# Patient Record
Sex: MALE | Race: WHITE | NOT HISPANIC OR LATINO
[De-identification: names, ages, dates, MRNs, and addresses within clinical notes are randomized per-mention and may not be internally consistent; named-entity substitution may affect disease eponyms.]

---

## 2018-12-28 ENCOUNTER — APPOINTMENT (OUTPATIENT)
Dept: INTERNAL MEDICINE | Facility: CLINIC | Age: 73
End: 2018-12-28
Payer: MEDICARE

## 2018-12-28 VITALS
DIASTOLIC BLOOD PRESSURE: 82 MMHG | BODY MASS INDEX: 28.93 KG/M2 | SYSTOLIC BLOOD PRESSURE: 150 MMHG | HEIGHT: 66 IN | WEIGHT: 180 LBS

## 2018-12-28 DIAGNOSIS — Z86.39 PERSONAL HISTORY OF OTHER ENDOCRINE, NUTRITIONAL AND METABOLIC DISEASE: ICD-10-CM

## 2018-12-28 DIAGNOSIS — Z86.79 PERSONAL HISTORY OF OTHER DISEASES OF THE CIRCULATORY SYSTEM: ICD-10-CM

## 2018-12-28 DIAGNOSIS — Z86.018 PERSONAL HISTORY OF OTHER BENIGN NEOPLASM: ICD-10-CM

## 2018-12-28 DIAGNOSIS — Z86.69 PERSONAL HISTORY OF OTHER DISEASES OF THE NERVOUS SYSTEM AND SENSE ORGANS: ICD-10-CM

## 2018-12-28 DIAGNOSIS — Z87.19 PERSONAL HISTORY OF OTHER DISEASES OF THE DIGESTIVE SYSTEM: ICD-10-CM

## 2018-12-28 PROCEDURE — 99213 OFFICE O/P EST LOW 20 MIN: CPT

## 2018-12-30 PROBLEM — Z87.19 HISTORY OF BARRETT'S ESOPHAGUS: Status: RESOLVED | Noted: 2018-12-28 | Resolved: 2018-12-30

## 2018-12-30 PROBLEM — Z86.69 HISTORY OF BENIGN ESSENTIAL TREMOR: Status: RESOLVED | Noted: 2018-12-28 | Resolved: 2018-12-30

## 2018-12-30 PROBLEM — Z86.79 HISTORY OF HYPERTENSION: Status: RESOLVED | Noted: 2018-12-28 | Resolved: 2018-12-30

## 2018-12-30 NOTE — PLAN
[FreeTextEntry1] : 72 y/o diabetic presents with c/o bruising right lower leg. He is concerned because it has extended down to heel in past several days, reassured it is resolving. If s/s increase or persist RTO

## 2018-12-30 NOTE — PHYSICAL EXAM
[No Acute Distress] : no acute distress [Well Nourished] : well nourished [Well Developed] : well developed [Well-Appearing] : well-appearing [Normal Sclera/Conjunctiva] : normal sclera/conjunctiva [PERRL] : pupils equal round and reactive to light [EOMI] : extraocular movements intact [Normal Outer Ear/Nose] : the outer ears and nose were normal in appearance [Normal Oropharynx] : the oropharynx was normal [No JVD] : no jugular venous distention [Supple] : supple [No Lymphadenopathy] : no lymphadenopathy [Thyroid Normal, No Nodules] : the thyroid was normal and there were no nodules present [No Respiratory Distress] : no respiratory distress  [Clear to Auscultation] : lungs were clear to auscultation bilaterally [No Accessory Muscle Use] : no accessory muscle use [Normal Rate] : normal rate  [Regular Rhythm] : with a regular rhythm [Normal S1, S2] : normal S1 and S2 [No Murmur] : no murmur heard [No Carotid Bruits] : no carotid bruits [No Abdominal Bruit] : a ~M bruit was not heard ~T in the abdomen [No Varicosities] : no varicosities [Pedal Pulses Present] : the pedal pulses are present [No Edema] : there was no peripheral edema [No Extremity Clubbing/Cyanosis] : no extremity clubbing/cyanosis [No Palpable Aorta] : no palpable aorta [Soft] : abdomen soft [Non Tender] : non-tender [Non-distended] : non-distended [No Masses] : no abdominal mass palpated [No HSM] : no HSM [Normal Bowel Sounds] : normal bowel sounds [Normal Posterior Cervical Nodes] : no posterior cervical lymphadenopathy [Normal Anterior Cervical Nodes] : no anterior cervical lymphadenopathy [No CVA Tenderness] : no CVA  tenderness [No Spinal Tenderness] : no spinal tenderness [No Joint Swelling] : no joint swelling [Grossly Normal Strength/Tone] : grossly normal strength/tone [No Rash] : no rash [Normal Gait] : normal gait [Coordination Grossly Intact] : coordination grossly intact [No Focal Deficits] : no focal deficits [Deep Tendon Reflexes (DTR)] : deep tendon reflexes were 2+ and symmetric [Memory Grossly Normal] : memory grossly normal [Normal Affect] : the affect was normal [Alert and Oriented x3] : oriented to person, place, and time [Normal Mood] : the mood was normal [Normal Insight/Judgement] : insight and judgment were intact [de-identified] : Lump/ecchymosis right lower leg, upper shin, ecchymosis extending down leg to heel. Non-tender, color evolving to greenish/yellow in places

## 2018-12-30 NOTE — HISTORY OF PRESENT ILLNESS
[FreeTextEntry8] : 73-year-old male presents with complaint of bruising on his right lower leg. Noticed black and blue lump upper medial left shin a few days ago and since then the discoloration or has spread down his leg to his heel. Unsure what if anything he hit it on, denies pain, burning/itching, no redness, no swelling

## 2018-12-31 PROBLEM — Z86.39 HISTORY OF HYPERLIPIDEMIA: Status: RESOLVED | Noted: 2018-12-28 | Resolved: 2018-12-31

## 2018-12-31 PROBLEM — Z86.018 HISTORY OF OSTEOMA: Status: RESOLVED | Noted: 2018-12-28 | Resolved: 2018-12-31

## 2018-12-31 PROBLEM — Z86.39 HISTORY OF TYPE 2 DIABETES MELLITUS: Status: RESOLVED | Noted: 2018-12-28 | Resolved: 2018-12-31

## 2019-02-15 ENCOUNTER — RX RENEWAL (OUTPATIENT)
Age: 74
End: 2019-02-15

## 2019-03-27 ENCOUNTER — RX RENEWAL (OUTPATIENT)
Age: 74
End: 2019-03-27

## 2019-04-13 ENCOUNTER — INBOUND DOCUMENT (OUTPATIENT)
Age: 74
End: 2019-04-13

## 2019-06-13 ENCOUNTER — RX RENEWAL (OUTPATIENT)
Age: 74
End: 2019-06-13

## 2019-06-15 ENCOUNTER — RX RENEWAL (OUTPATIENT)
Age: 74
End: 2019-06-15

## 2019-07-11 ENCOUNTER — RX RENEWAL (OUTPATIENT)
Age: 74
End: 2019-07-11

## 2019-08-07 ENCOUNTER — RX RENEWAL (OUTPATIENT)
Age: 74
End: 2019-08-07

## 2019-10-22 ENCOUNTER — RECORD ABSTRACTING (OUTPATIENT)
Age: 74
End: 2019-10-22

## 2019-10-22 DIAGNOSIS — Z72.89 OTHER PROBLEMS RELATED TO LIFESTYLE: ICD-10-CM

## 2019-10-22 DIAGNOSIS — Z80.9 FAMILY HISTORY OF MALIGNANT NEOPLASM, UNSPECIFIED: ICD-10-CM

## 2019-10-22 DIAGNOSIS — Z87.891 PERSONAL HISTORY OF NICOTINE DEPENDENCE: ICD-10-CM

## 2019-10-22 RX ORDER — PEN NEEDLE, DIABETIC 33 GX5/32"
NEEDLE, DISPOSABLE MISCELLANEOUS
Refills: 0 | Status: ACTIVE | COMMUNITY

## 2019-11-04 ENCOUNTER — NON-APPOINTMENT (OUTPATIENT)
Age: 74
End: 2019-11-04

## 2019-11-04 ENCOUNTER — APPOINTMENT (OUTPATIENT)
Dept: INTERNAL MEDICINE | Facility: CLINIC | Age: 74
End: 2019-11-04
Payer: MEDICARE

## 2019-11-04 VITALS
SYSTOLIC BLOOD PRESSURE: 146 MMHG | WEIGHT: 187 LBS | DIASTOLIC BLOOD PRESSURE: 82 MMHG | HEIGHT: 66 IN | TEMPERATURE: 96.8 F | BODY MASS INDEX: 30.05 KG/M2

## 2019-11-04 PROCEDURE — 93000 ELECTROCARDIOGRAM COMPLETE: CPT

## 2019-11-04 PROCEDURE — 36415 COLL VENOUS BLD VENIPUNCTURE: CPT

## 2019-11-04 PROCEDURE — 90662 IIV NO PRSV INCREASED AG IM: CPT

## 2019-11-04 PROCEDURE — G0008: CPT

## 2019-11-04 PROCEDURE — 99214 OFFICE O/P EST MOD 30 MIN: CPT | Mod: 25

## 2019-11-04 NOTE — PLAN
[FreeTextEntry1] : 74-year-old male diabetic presents for annual exam, feels well, denies chest pain shortness of breath palpitations dizziness.\par \par Overdue for colonoscopy and upper endoscopy, he will call Dr. Ball's office\par Call one week to review today's labs

## 2019-11-04 NOTE — HISTORY OF PRESENT ILLNESS
[FreeTextEntry1] : Annual exam [de-identified] : 74-year-old male diabetic presents for annual exam, feels well, denies chest pain shortness of breath palpitations dizziness. He checks his glucose occasionally with results being in the low 100s. Taking medication as prescribed. Uses Lotrisone occasionally for intertriginous candida mostly in the summer

## 2019-11-04 NOTE — HEALTH RISK ASSESSMENT
[Yes] : Yes [2 - 3 times a week (3 pts)] : 2 - 3  times a week (3 points) [5 or 6 (2 pts)] : 5 or 6 (2  points) [Never (0 pts)] : Never (0 points) [One fall no injury in past year] : Patient reported one fall in the past year without injury [0] : 2) Feeling down, depressed, or hopeless: Not at all (0) [Patient declined mammogram] : Patient declined mammogram [Patient declined PAP Smear] : Patient declined PAP Smear [Patient declined bone density test] : Patient declined bone density test [Patient reported colonoscopy was normal] : Patient reported colonoscopy was normal [HIV test declined] : HIV test declined [Hepatitis C test declined] : Hepatitis C test declined [None] : None [With Family] : lives with family [] :  [Good] : ~his/her~  mood as  good [] : No [[id number]] : 0 [Change in mental status noted] : No change in mental status noted [ColonoscopyDate] : 2014

## 2019-11-04 NOTE — PHYSICAL EXAM
[Normal] : normal gait, coordination grossly intact, no focal deficits [de-identified] : Denies pain, lumps and discharge [FreeTextEntry1] : Deferred GI [de-identified] : Bladder Normal on Palpation [de-identified] : No lymphadenopathy [de-identified] : Denies excessive thirst, urination, fatigue [de-identified] : No rash or skin lesion [de-identified] : Alert and Oriented x3.  Appropriate mood and affect

## 2019-11-04 NOTE — COUNSELING
[Fall prevention counseling provided] : Fall prevention counseling provided [Benefits of weight loss discussed] : Benefits of weight loss discussed [Encouraged to increase physical activity] : Encouraged to increase physical activity

## 2019-11-05 LAB
25(OH)D3 SERPL-MCNC: 27.7 NG/ML
ALBUMIN SERPL ELPH-MCNC: 5 G/DL
ALP BLD-CCNC: 56 U/L
ALT SERPL-CCNC: 25 U/L
ANION GAP SERPL CALC-SCNC: 15 MMOL/L
APPEARANCE: CLEAR
AST SERPL-CCNC: 23 U/L
BASOPHILS # BLD AUTO: 0.04 K/UL
BASOPHILS NFR BLD AUTO: 0.6 %
BILIRUB SERPL-MCNC: 0.4 MG/DL
BILIRUBIN URINE: NEGATIVE
BLOOD URINE: NEGATIVE
BUN SERPL-MCNC: 15 MG/DL
CALCIUM SERPL-MCNC: 10.1 MG/DL
CHLORIDE SERPL-SCNC: 98 MMOL/L
CHOLEST SERPL-MCNC: 156 MG/DL
CHOLEST/HDLC SERPL: 3.3 RATIO
CO2 SERPL-SCNC: 22 MMOL/L
COLOR: YELLOW
CREAT SERPL-MCNC: 1.22 MG/DL
EOSINOPHIL # BLD AUTO: 0.13 K/UL
EOSINOPHIL NFR BLD AUTO: 2 %
ESTIMATED AVERAGE GLUCOSE: 143 MG/DL
FOLATE SERPL-MCNC: 15.6 NG/ML
GLUCOSE QUALITATIVE U: ABNORMAL
GLUCOSE SERPL-MCNC: 176 MG/DL
HBA1C MFR BLD HPLC: 6.6 %
HCT VFR BLD CALC: 45.5 %
HDLC SERPL-MCNC: 48 MG/DL
HGB BLD-MCNC: 14.6 G/DL
IMM GRANULOCYTES NFR BLD AUTO: 0.3 %
KETONES URINE: NEGATIVE
LDLC SERPL CALC-MCNC: 62 MG/DL
LEUKOCYTE ESTERASE URINE: NEGATIVE
LYMPHOCYTES # BLD AUTO: 1.59 K/UL
LYMPHOCYTES NFR BLD AUTO: 24.8 %
MAN DIFF?: NORMAL
MCHC RBC-ENTMCNC: 30.4 PG
MCHC RBC-ENTMCNC: 32.1 GM/DL
MCV RBC AUTO: 94.6 FL
MONOCYTES # BLD AUTO: 0.74 K/UL
MONOCYTES NFR BLD AUTO: 11.5 %
NEUTROPHILS # BLD AUTO: 3.89 K/UL
NEUTROPHILS NFR BLD AUTO: 60.8 %
NITRITE URINE: NEGATIVE
PH URINE: 5.5
PLATELET # BLD AUTO: 259 K/UL
POTASSIUM SERPL-SCNC: 5.2 MMOL/L
PROT SERPL-MCNC: 8.4 G/DL
PROTEIN URINE: NORMAL
PSA FREE FLD-MCNC: 26 %
PSA FREE SERPL-MCNC: 0.8 NG/ML
PSA SERPL-MCNC: 3.09 NG/ML
RBC # BLD: 4.81 M/UL
RBC # FLD: 13 %
SODIUM SERPL-SCNC: 135 MMOL/L
SPECIFIC GRAVITY URINE: 1.02
TRIGL SERPL-MCNC: 232 MG/DL
TSH SERPL-ACNC: 3.82 UIU/ML
UROBILINOGEN URINE: NORMAL
VIT B12 SERPL-MCNC: 351 PG/ML
WBC # FLD AUTO: 6.41 K/UL

## 2019-11-23 ENCOUNTER — RX RENEWAL (OUTPATIENT)
Age: 74
End: 2019-11-23

## 2019-12-30 ENCOUNTER — RESULT REVIEW (OUTPATIENT)
Age: 74
End: 2019-12-30

## 2019-12-31 ENCOUNTER — APPOINTMENT (OUTPATIENT)
Dept: GASTROENTEROLOGY | Facility: HOSPITAL | Age: 74
End: 2019-12-31

## 2020-03-04 ENCOUNTER — RX RENEWAL (OUTPATIENT)
Age: 75
End: 2020-03-04

## 2020-05-10 ENCOUNTER — RX RENEWAL (OUTPATIENT)
Age: 75
End: 2020-05-10

## 2020-06-05 ENCOUNTER — APPOINTMENT (OUTPATIENT)
Dept: INTERNAL MEDICINE | Facility: CLINIC | Age: 75
End: 2020-06-05
Payer: MEDICARE

## 2020-06-05 VITALS
DIASTOLIC BLOOD PRESSURE: 68 MMHG | SYSTOLIC BLOOD PRESSURE: 126 MMHG | WEIGHT: 187 LBS | HEIGHT: 66 IN | BODY MASS INDEX: 30.05 KG/M2

## 2020-06-05 PROCEDURE — 36415 COLL VENOUS BLD VENIPUNCTURE: CPT

## 2020-06-05 PROCEDURE — 99213 OFFICE O/P EST LOW 20 MIN: CPT | Mod: 25

## 2020-06-05 RX ORDER — SITAGLIPTIN 50 MG/1
50 TABLET, FILM COATED ORAL
Qty: 90 | Refills: 3 | Status: DISCONTINUED | COMMUNITY
Start: 2019-08-07 | End: 2020-06-05

## 2020-06-05 NOTE — PLAN
[FreeTextEntry1] : 68 six-year[age]old male with diabetes, hypertension, hyperlipidemia, primary insomnia presents for routine followup. Patient feels very well not particularly physically active but denies chest pain shortness of breath palpitations dizziness, and no lower extremity swelling.\par \par Prescription insurance has changed and he needs PA for  Levemir which we will try to obtain\par \par Review diet and encouraged to increase exercise i.e. walking\par \par Call next week to review labs

## 2020-06-05 NOTE — PHYSICAL EXAM
[No Acute Distress] : no acute distress [Well Nourished] : well nourished [Well Developed] : well developed [Well-Appearing] : well-appearing [Normal Sclera/Conjunctiva] : normal sclera/conjunctiva [PERRL] : pupils equal round and reactive to light [Normal Outer Ear/Nose] : the outer ears and nose were normal in appearance [EOMI] : extraocular movements intact [Normal Oropharynx] : the oropharynx was normal [No Lymphadenopathy] : no lymphadenopathy [No JVD] : no jugular venous distention [Supple] : supple [Thyroid Normal, No Nodules] : the thyroid was normal and there were no nodules present [No Respiratory Distress] : no respiratory distress  [No Accessory Muscle Use] : no accessory muscle use [Clear to Auscultation] : lungs were clear to auscultation bilaterally [Normal Rate] : normal rate  [Regular Rhythm] : with a regular rhythm [Normal S1, S2] : normal S1 and S2 [No Carotid Bruits] : no carotid bruits [No Murmur] : no murmur heard [No Abdominal Bruit] : a ~M bruit was not heard ~T in the abdomen [No Varicosities] : no varicosities [Pedal Pulses Present] : the pedal pulses are present [No Edema] : there was no peripheral edema [No Palpable Aorta] : no palpable aorta [No Extremity Clubbing/Cyanosis] : no extremity clubbing/cyanosis [Soft] : abdomen soft [Non Tender] : non-tender [Non-distended] : non-distended [No Masses] : no abdominal mass palpated [No HSM] : no HSM [Normal Bowel Sounds] : normal bowel sounds [Normal Posterior Cervical Nodes] : no posterior cervical lymphadenopathy [Normal Anterior Cervical Nodes] : no anterior cervical lymphadenopathy [No CVA Tenderness] : no CVA  tenderness [No Spinal Tenderness] : no spinal tenderness [No Joint Swelling] : no joint swelling [Grossly Normal Strength/Tone] : grossly normal strength/tone [No Rash] : no rash [No Focal Deficits] : no focal deficits [Coordination Grossly Intact] : coordination grossly intact [Normal Gait] : normal gait [Deep Tendon Reflexes (DTR)] : deep tendon reflexes were 2+ and symmetric [Normal Affect] : the affect was normal [Normal Insight/Judgement] : insight and judgment were intact

## 2020-06-05 NOTE — HISTORY OF PRESENT ILLNESS
[FreeTextEntry1] : Followup diabetes, hypertension, insomnia [de-identified] : 74 year old male presents for followup as noted, feels very well, denies chest pain shortness of breath palpitations dizziness. Denies any problems during pandemic , still able to see grandchildren on weekends

## 2020-06-06 LAB
ALBUMIN SERPL ELPH-MCNC: 4.9 G/DL
ALP BLD-CCNC: 56 U/L
ALT SERPL-CCNC: 20 U/L
ANION GAP SERPL CALC-SCNC: 13 MMOL/L
AST SERPL-CCNC: 21 U/L
BASOPHILS # BLD AUTO: 0.04 K/UL
BASOPHILS NFR BLD AUTO: 0.6 %
BILIRUB SERPL-MCNC: 0.2 MG/DL
BUN SERPL-MCNC: 19 MG/DL
CALCIUM SERPL-MCNC: 10 MG/DL
CHLORIDE SERPL-SCNC: 101 MMOL/L
CHOLEST SERPL-MCNC: 145 MG/DL
CHOLEST/HDLC SERPL: 3 RATIO
CO2 SERPL-SCNC: 23 MMOL/L
CREAT SERPL-MCNC: 1.06 MG/DL
EOSINOPHIL # BLD AUTO: 0.11 K/UL
EOSINOPHIL NFR BLD AUTO: 1.6 %
ESTIMATED AVERAGE GLUCOSE: 140 MG/DL
GLUCOSE SERPL-MCNC: 138 MG/DL
HBA1C MFR BLD HPLC: 6.5 %
HCT VFR BLD CALC: 41 %
HDLC SERPL-MCNC: 48 MG/DL
HGB BLD-MCNC: 13.2 G/DL
IMM GRANULOCYTES NFR BLD AUTO: 0.4 %
LDLC SERPL CALC-MCNC: 64 MG/DL
LYMPHOCYTES # BLD AUTO: 1.95 K/UL
LYMPHOCYTES NFR BLD AUTO: 27.6 %
MAN DIFF?: NORMAL
MCHC RBC-ENTMCNC: 29.9 PG
MCHC RBC-ENTMCNC: 32.2 GM/DL
MCV RBC AUTO: 93 FL
MONOCYTES # BLD AUTO: 0.83 K/UL
MONOCYTES NFR BLD AUTO: 11.8 %
NEUTROPHILS # BLD AUTO: 4.1 K/UL
NEUTROPHILS NFR BLD AUTO: 58 %
PLATELET # BLD AUTO: 238 K/UL
POTASSIUM SERPL-SCNC: 5 MMOL/L
PROT SERPL-MCNC: 7.8 G/DL
RBC # BLD: 4.41 M/UL
RBC # FLD: 13.4 %
SARS-COV-2 IGG SERPL IA-ACNC: 0.1 INDEX
SARS-COV-2 IGG SERPL QL IA: NEGATIVE
SODIUM SERPL-SCNC: 137 MMOL/L
TRIGL SERPL-MCNC: 164 MG/DL
WBC # FLD AUTO: 7.06 K/UL

## 2020-07-09 ENCOUNTER — RX RENEWAL (OUTPATIENT)
Age: 75
End: 2020-07-09

## 2020-12-03 ENCOUNTER — APPOINTMENT (OUTPATIENT)
Dept: INTERNAL MEDICINE | Facility: CLINIC | Age: 75
End: 2020-12-03
Payer: MEDICARE

## 2020-12-03 VITALS
TEMPERATURE: 95.6 F | WEIGHT: 178 LBS | DIASTOLIC BLOOD PRESSURE: 70 MMHG | BODY MASS INDEX: 28.61 KG/M2 | HEIGHT: 66 IN | SYSTOLIC BLOOD PRESSURE: 134 MMHG

## 2020-12-03 PROCEDURE — 36415 COLL VENOUS BLD VENIPUNCTURE: CPT

## 2020-12-03 PROCEDURE — 99213 OFFICE O/P EST LOW 20 MIN: CPT | Mod: 25

## 2020-12-03 PROCEDURE — 90662 IIV NO PRSV INCREASED AG IM: CPT

## 2020-12-03 PROCEDURE — G0008: CPT

## 2020-12-03 NOTE — PLAN
[FreeTextEntry1] : 75-year-old male is noted presents for followup, feels well, no complaints offered. Denies chest pain shortness of breath palpitations dizziness\par \par Review diet and stressed the importance of regular exercise i.e. walking.\par \par Followup 3-4 months for annual exam. Call next week for lab results from today's visit

## 2020-12-03 NOTE — HISTORY OF PRESENT ILLNESS
[FreeTextEntry1] : Followup diabetes, hypertension [de-identified] : 75-year-old male diabetic, hypertensive presents for followup. Feels very well, denies chest pain shortness of breath palpitations dizziness. Walking occasionally for exercise but fairly sedentary for the most part. Check sugar occasionally,, stable in the low 100s.

## 2020-12-04 LAB
ALBUMIN SERPL ELPH-MCNC: 4.8 G/DL
ALP BLD-CCNC: 55 U/L
ALT SERPL-CCNC: 22 U/L
ANION GAP SERPL CALC-SCNC: 11 MMOL/L
AST SERPL-CCNC: 23 U/L
BASOPHILS # BLD AUTO: 0.06 K/UL
BASOPHILS NFR BLD AUTO: 0.8 %
BILIRUB SERPL-MCNC: 0.3 MG/DL
BUN SERPL-MCNC: 21 MG/DL
CALCIUM SERPL-MCNC: 10.1 MG/DL
CHLORIDE SERPL-SCNC: 99 MMOL/L
CO2 SERPL-SCNC: 25 MMOL/L
CREAT SERPL-MCNC: 1.24 MG/DL
EOSINOPHIL # BLD AUTO: 0.25 K/UL
EOSINOPHIL NFR BLD AUTO: 3.2 %
ESTIMATED AVERAGE GLUCOSE: 128 MG/DL
GLUCOSE SERPL-MCNC: 136 MG/DL
HBA1C MFR BLD HPLC: 6.1 %
HCT VFR BLD CALC: 42.5 %
HGB BLD-MCNC: 13.8 G/DL
IMM GRANULOCYTES NFR BLD AUTO: 0.3 %
LYMPHOCYTES # BLD AUTO: 1.7 K/UL
LYMPHOCYTES NFR BLD AUTO: 21.7 %
MAN DIFF?: NORMAL
MCHC RBC-ENTMCNC: 31.1 PG
MCHC RBC-ENTMCNC: 32.5 GM/DL
MCV RBC AUTO: 95.7 FL
MONOCYTES # BLD AUTO: 1.09 K/UL
MONOCYTES NFR BLD AUTO: 13.9 %
NEUTROPHILS # BLD AUTO: 4.73 K/UL
NEUTROPHILS NFR BLD AUTO: 60.1 %
PLATELET # BLD AUTO: 250 K/UL
POTASSIUM SERPL-SCNC: 5.3 MMOL/L
PROT SERPL-MCNC: 8 G/DL
RBC # BLD: 4.44 M/UL
RBC # FLD: 13.1 %
SODIUM SERPL-SCNC: 136 MMOL/L
WBC # FLD AUTO: 7.85 K/UL

## 2021-01-20 ENCOUNTER — RX RENEWAL (OUTPATIENT)
Age: 76
End: 2021-01-20

## 2021-03-03 ENCOUNTER — RX RENEWAL (OUTPATIENT)
Age: 76
End: 2021-03-03

## 2021-05-03 NOTE — REVIEW OF SYSTEMS
[Duration: ___ wks] : duration: [unfilled] weeks [Normal?] : normal delivery [Negative] : Heme/Lymph [de-identified] : bruise right shin

## 2021-05-04 ENCOUNTER — RX RENEWAL (OUTPATIENT)
Age: 76
End: 2021-05-04

## 2021-06-17 ENCOUNTER — APPOINTMENT (OUTPATIENT)
Dept: INTERNAL MEDICINE | Facility: CLINIC | Age: 76
End: 2021-06-17
Payer: MEDICARE

## 2021-06-17 VITALS
SYSTOLIC BLOOD PRESSURE: 146 MMHG | BODY MASS INDEX: 29.25 KG/M2 | WEIGHT: 182 LBS | HEIGHT: 66 IN | DIASTOLIC BLOOD PRESSURE: 60 MMHG

## 2021-06-17 DIAGNOSIS — Z20.822 CONTACT WITH AND (SUSPECTED) EXPOSURE TO COVID-19: ICD-10-CM

## 2021-06-17 PROCEDURE — 99213 OFFICE O/P EST LOW 20 MIN: CPT | Mod: 25,CS

## 2021-06-17 PROCEDURE — 36415 COLL VENOUS BLD VENIPUNCTURE: CPT

## 2021-06-17 RX ORDER — ASPIRIN ENTERIC COATED TABLETS 81 MG 81 MG/1
81 TABLET, DELAYED RELEASE ORAL
Refills: 0 | Status: DISCONTINUED | COMMUNITY
End: 2021-06-17

## 2021-06-17 NOTE — HISTORY OF PRESENT ILLNESS
[FreeTextEntry1] : Followup diabetes, hyperlipidemia, covid infection [de-identified] : 75-year-old male presents for followup as noted, in March of this year he and his wife were sick and they both tested positive for covert. They were sick for about 2 weeks, never called the office so there was no treatment  but they both fully recovered.\par \par Denies chest pain shortness of breath palpitations dizziness. He states he lost weight while he was sick but he since gained back-unfortunately.

## 2021-06-17 NOTE — PLAN
[FreeTextEntry1] : 75-year-old male as noted, feels very well, fully recovered from virus. Denies chest pain shortness of breath palpitations dizziness\par \par Reviewed diet and stressed the importance of increasing activity/exercise/walking.\par \par Call next week to review labs

## 2021-06-18 LAB
25(OH)D3 SERPL-MCNC: 24.8 NG/ML
ALBUMIN SERPL ELPH-MCNC: 4.7 G/DL
ALP BLD-CCNC: 59 U/L
ALT SERPL-CCNC: 20 U/L
ANION GAP SERPL CALC-SCNC: 13 MMOL/L
AST SERPL-CCNC: 26 U/L
BASOPHILS # BLD AUTO: 0.04 K/UL
BASOPHILS NFR BLD AUTO: 0.6 %
BILIRUB SERPL-MCNC: 0.2 MG/DL
BUN SERPL-MCNC: 19 MG/DL
CALCIUM SERPL-MCNC: 10 MG/DL
CHLORIDE SERPL-SCNC: 102 MMOL/L
CHOLEST SERPL-MCNC: 163 MG/DL
CO2 SERPL-SCNC: 21 MMOL/L
COVID-19 NUCLEOCAPSID  GAM ANTIBODY INTERPRETATION: POSITIVE
CREAT SERPL-MCNC: 1.07 MG/DL
EOSINOPHIL # BLD AUTO: 0.14 K/UL
EOSINOPHIL NFR BLD AUTO: 2.3 %
ESTIMATED AVERAGE GLUCOSE: 143 MG/DL
GLUCOSE SERPL-MCNC: 135 MG/DL
HBA1C MFR BLD HPLC: 6.6 %
HCT VFR BLD CALC: 41.1 %
HDLC SERPL-MCNC: 48 MG/DL
HGB BLD-MCNC: 13.5 G/DL
IMM GRANULOCYTES NFR BLD AUTO: 0.3 %
LDLC SERPL CALC-MCNC: 78 MG/DL
LYMPHOCYTES # BLD AUTO: 1.63 K/UL
LYMPHOCYTES NFR BLD AUTO: 26.3 %
MAN DIFF?: NORMAL
MCHC RBC-ENTMCNC: 30.1 PG
MCHC RBC-ENTMCNC: 32.8 GM/DL
MCV RBC AUTO: 91.5 FL
MONOCYTES # BLD AUTO: 0.72 K/UL
MONOCYTES NFR BLD AUTO: 11.6 %
NEUTROPHILS # BLD AUTO: 3.65 K/UL
NEUTROPHILS NFR BLD AUTO: 58.9 %
NONHDLC SERPL-MCNC: 115 MG/DL
PLATELET # BLD AUTO: 255 K/UL
POTASSIUM SERPL-SCNC: 4.9 MMOL/L
PROT SERPL-MCNC: 8 G/DL
RBC # BLD: 4.49 M/UL
RBC # FLD: 13.8 %
SARS-COV-2 AB SERPL QL IA: 177 INDEX
SODIUM SERPL-SCNC: 137 MMOL/L
TRIGL SERPL-MCNC: 185 MG/DL
WBC # FLD AUTO: 6.2 K/UL

## 2021-06-27 ENCOUNTER — RX RENEWAL (OUTPATIENT)
Age: 76
End: 2021-06-27

## 2021-10-25 ENCOUNTER — APPOINTMENT (OUTPATIENT)
Dept: INTERNAL MEDICINE | Facility: CLINIC | Age: 76
End: 2021-10-25
Payer: MEDICARE

## 2021-10-25 VITALS
WEIGHT: 178 LBS | BODY MASS INDEX: 28.61 KG/M2 | SYSTOLIC BLOOD PRESSURE: 122 MMHG | HEIGHT: 66 IN | DIASTOLIC BLOOD PRESSURE: 64 MMHG

## 2021-10-25 DIAGNOSIS — Z92.29 PERSONAL HISTORY OF OTHER DRUG THERAPY: ICD-10-CM

## 2021-10-25 DIAGNOSIS — Z23 ENCOUNTER FOR IMMUNIZATION: ICD-10-CM

## 2021-10-25 PROCEDURE — 36415 COLL VENOUS BLD VENIPUNCTURE: CPT

## 2021-10-25 PROCEDURE — G0008: CPT

## 2021-10-25 PROCEDURE — 90662 IIV NO PRSV INCREASED AG IM: CPT

## 2021-10-25 PROCEDURE — 99213 OFFICE O/P EST LOW 20 MIN: CPT | Mod: 25

## 2021-10-25 RX ORDER — CLOTRIMAZOLE AND BETAMETHASONE DIPROPIONATE 10; .5 MG/G; MG/G
1-0.05 CREAM TOPICAL
Qty: 1 | Refills: 2 | Status: ACTIVE | COMMUNITY
Start: 1900-01-01 | End: 1900-01-01

## 2021-10-25 NOTE — HISTORY OF PRESENT ILLNESS
[FreeTextEntry1] : Followup diabetes, hyperlipidemia [de-identified] : 76-year-old male diabetic presents with his wife for followup, he states he feels great, no complaints offered, denies chest pain shortness of breath palpitations dizziness. Takes medications as prescribed, not testing as frequently as I would like but he feels well, no c/o, A1C in June 6.6%

## 2021-10-25 NOTE — PLAN
[FreeTextEntry1] : 76-year-old male diabetic has noted presents for follow up, no complaints offered, states he feels great, denies chest pain shortness of breath palpitations dizziness\par \par Reviewed diet, encouraged increasing exercise i.e. walking, reviewed glucose readings.\par \par Call 2-3 days to review labs

## 2021-10-26 LAB
ALBUMIN SERPL ELPH-MCNC: 4.6 G/DL
ALP BLD-CCNC: 56 U/L
ALT SERPL-CCNC: 22 U/L
ANION GAP SERPL CALC-SCNC: 14 MMOL/L
AST SERPL-CCNC: 28 U/L
BASOPHILS # BLD AUTO: 0.05 K/UL
BASOPHILS NFR BLD AUTO: 0.7 %
BILIRUB SERPL-MCNC: 0.2 MG/DL
BUN SERPL-MCNC: 19 MG/DL
CALCIUM SERPL-MCNC: 9.8 MG/DL
CHLORIDE SERPL-SCNC: 99 MMOL/L
CHOLEST SERPL-MCNC: 157 MG/DL
CO2 SERPL-SCNC: 21 MMOL/L
CREAT SERPL-MCNC: 1.24 MG/DL
EOSINOPHIL # BLD AUTO: 0.15 K/UL
EOSINOPHIL NFR BLD AUTO: 2 %
ESTIMATED AVERAGE GLUCOSE: 128 MG/DL
GLUCOSE SERPL-MCNC: 103 MG/DL
HBA1C MFR BLD HPLC: 6.1 %
HCT VFR BLD CALC: 42 %
HDLC SERPL-MCNC: 49 MG/DL
HGB BLD-MCNC: 13.8 G/DL
IMM GRANULOCYTES NFR BLD AUTO: 0.3 %
LDLC SERPL CALC-MCNC: 74 MG/DL
LYMPHOCYTES # BLD AUTO: 1.87 K/UL
LYMPHOCYTES NFR BLD AUTO: 24.8 %
MAN DIFF?: NORMAL
MCHC RBC-ENTMCNC: 31.7 PG
MCHC RBC-ENTMCNC: 32.9 GM/DL
MCV RBC AUTO: 96.3 FL
MONOCYTES # BLD AUTO: 0.86 K/UL
MONOCYTES NFR BLD AUTO: 11.4 %
NEUTROPHILS # BLD AUTO: 4.6 K/UL
NEUTROPHILS NFR BLD AUTO: 60.8 %
NONHDLC SERPL-MCNC: 108 MG/DL
PLATELET # BLD AUTO: 233 K/UL
POTASSIUM SERPL-SCNC: 5 MMOL/L
PROT SERPL-MCNC: 7.8 G/DL
RBC # BLD: 4.36 M/UL
RBC # FLD: 13.4 %
SODIUM SERPL-SCNC: 134 MMOL/L
TRIGL SERPL-MCNC: 172 MG/DL
WBC # FLD AUTO: 7.55 K/UL

## 2021-10-27 LAB — GGT SERPL-CCNC: 39 U/L

## 2021-10-28 LAB
COVID-19 SPIKE DOMAIN ANTIBODY INTERPRETATION: POSITIVE
SARS-COV-2 AB SERPL IA-ACNC: >250 U/ML

## 2022-01-27 ENCOUNTER — RX RENEWAL (OUTPATIENT)
Age: 77
End: 2022-01-27

## 2022-03-11 ENCOUNTER — RX RENEWAL (OUTPATIENT)
Age: 77
End: 2022-03-11

## 2022-04-27 ENCOUNTER — RX RENEWAL (OUTPATIENT)
Age: 77
End: 2022-04-27

## 2022-06-03 ENCOUNTER — RX RENEWAL (OUTPATIENT)
Age: 77
End: 2022-06-03

## 2022-07-19 ENCOUNTER — APPOINTMENT (OUTPATIENT)
Dept: FAMILY MEDICINE | Facility: CLINIC | Age: 77
End: 2022-07-19

## 2022-08-04 ENCOUNTER — APPOINTMENT (OUTPATIENT)
Dept: FAMILY MEDICINE | Facility: CLINIC | Age: 77
End: 2022-08-04

## 2022-08-04 VITALS
HEART RATE: 95 BPM | DIASTOLIC BLOOD PRESSURE: 58 MMHG | SYSTOLIC BLOOD PRESSURE: 112 MMHG | TEMPERATURE: 96.7 F | HEIGHT: 66 IN | OXYGEN SATURATION: 98 % | BODY MASS INDEX: 27.8 KG/M2 | WEIGHT: 173 LBS | RESPIRATION RATE: 16 BRPM

## 2022-08-04 DIAGNOSIS — Z76.89 PERSONS ENCOUNTERING HEALTH SERVICES IN OTHER SPECIFIED CIRCUMSTANCES: ICD-10-CM

## 2022-08-04 PROCEDURE — 36415 COLL VENOUS BLD VENIPUNCTURE: CPT

## 2022-08-04 PROCEDURE — 99214 OFFICE O/P EST MOD 30 MIN: CPT | Mod: 25

## 2022-08-05 LAB
CHOLEST SERPL-MCNC: 141 MG/DL
ESTIMATED AVERAGE GLUCOSE: 134 MG/DL
HBA1C MFR BLD HPLC: 6.3 %
HDLC SERPL-MCNC: 53 MG/DL
LDLC SERPL CALC-MCNC: 44 MG/DL
NONHDLC SERPL-MCNC: 88 MG/DL
TRIGL SERPL-MCNC: 223 MG/DL

## 2022-08-07 NOTE — HISTORY OF PRESENT ILLNESS
[FreeTextEntry8] : 77 yo M with hx of DM2 here to establish care with new physician and have Hgb A1c monitored as well as lipids. Reports no acute concerns, taking medications as prescribed. Would like to defer annual physical to next visit.

## 2022-08-07 NOTE — PLAN
[FreeTextEntry1] : BP well controlled, labs drawn\par DM2, labs drawn\par Reviewed lipids, repeat drawn in office

## 2022-08-18 LAB
25(OH)D3 SERPL-MCNC: 25 NG/ML
ALBUMIN SERPL ELPH-MCNC: 4.7 G/DL
ALP BLD-CCNC: 63 U/L
ALT SERPL-CCNC: 19 U/L
ANION GAP SERPL CALC-SCNC: 17 MMOL/L
AST SERPL-CCNC: 25 U/L
BILIRUB SERPL-MCNC: 0.3 MG/DL
BUN SERPL-MCNC: 28 MG/DL
CALCIUM SERPL-MCNC: 9.9 MG/DL
CHLORIDE SERPL-SCNC: 100 MMOL/L
CO2 SERPL-SCNC: 17 MMOL/L
CREAT SERPL-MCNC: 1.81 MG/DL
EGFR: 38 ML/MIN/1.73M2
GLUCOSE SERPL-MCNC: 136 MG/DL
POTASSIUM SERPL-SCNC: 4.5 MMOL/L
PROT SERPL-MCNC: 7.8 G/DL
PSA FREE FLD-MCNC: 23 %
PSA FREE SERPL-MCNC: 0.85 NG/ML
PSA SERPL-MCNC: 3.73 NG/ML
SODIUM SERPL-SCNC: 134 MMOL/L
T4 FREE SERPL-MCNC: 1.2 NG/DL
TSH SERPL-ACNC: 4.46 UIU/ML

## 2022-08-30 ENCOUNTER — APPOINTMENT (OUTPATIENT)
Dept: INTERNAL MEDICINE | Facility: CLINIC | Age: 77
End: 2022-08-30

## 2022-08-30 PROCEDURE — 36415 COLL VENOUS BLD VENIPUNCTURE: CPT

## 2022-09-01 LAB
ALBUMIN SERPL ELPH-MCNC: 4.9 G/DL
ALP BLD-CCNC: 56 U/L
ALT SERPL-CCNC: 18 U/L
ANION GAP SERPL CALC-SCNC: 14 MMOL/L
AST SERPL-CCNC: 23 U/L
BILIRUB SERPL-MCNC: 0.2 MG/DL
BUN SERPL-MCNC: 18 MG/DL
CALCIUM SERPL-MCNC: 9.8 MG/DL
CHLORIDE SERPL-SCNC: 100 MMOL/L
CO2 SERPL-SCNC: 21 MMOL/L
CREAT SERPL-MCNC: 1.13 MG/DL
EGFR: 67 ML/MIN/1.73M2
GLUCOSE SERPL-MCNC: 115 MG/DL
POTASSIUM SERPL-SCNC: 5.3 MMOL/L
PROT SERPL-MCNC: 7.8 G/DL
SODIUM SERPL-SCNC: 135 MMOL/L

## 2022-09-09 ENCOUNTER — APPOINTMENT (OUTPATIENT)
Dept: FAMILY MEDICINE | Facility: CLINIC | Age: 77
End: 2022-09-09

## 2022-09-09 VITALS
SYSTOLIC BLOOD PRESSURE: 132 MMHG | DIASTOLIC BLOOD PRESSURE: 70 MMHG | TEMPERATURE: 95.1 F | HEART RATE: 75 BPM | BODY MASS INDEX: 28.93 KG/M2 | WEIGHT: 180 LBS | HEIGHT: 66 IN | OXYGEN SATURATION: 99 %

## 2022-09-09 DIAGNOSIS — Z87.898 PERSONAL HISTORY OF OTHER SPECIFIED CONDITIONS: ICD-10-CM

## 2022-09-09 PROCEDURE — G0439: CPT

## 2022-09-09 PROCEDURE — 36415 COLL VENOUS BLD VENIPUNCTURE: CPT

## 2022-09-11 PROBLEM — Z87.898 HISTORY OF URINARY FREQUENCY: Status: RESOLVED | Noted: 2019-11-04 | Resolved: 2022-09-11

## 2022-09-11 NOTE — HEALTH RISK ASSESSMENT
[Good] : ~his/her~  mood as  good [Former] : Former [Yes] : Yes [Monthly or less (1 pt)] : Monthly or less (1 point) [1 or 2 (0 pts)] : 1 or 2 (0 points) [Never (0 pts)] : Never (0 points) [No falls in past year] : Patient reported no falls in the past year [0] : 2) Feeling down, depressed, or hopeless: Not at all (0) [Patient declined mammogram] : Patient declined mammogram [Patient declined PAP Smear] : Patient declined PAP Smear [Patient declined bone density test] : Patient declined bone density test [Patient reported colonoscopy was normal] : Patient reported colonoscopy was normal [[id number]] : 0 [ColonoscopyDate] : 12/19

## 2022-09-11 NOTE — HISTORY OF PRESENT ILLNESS
[FreeTextEntry1] : Annual physical [de-identified] : 78 yo M with hx of DM2, Springer's esophagus, HTN, HLD, and recently resolved episode of BETHANIE with unclear etiology, here for an annual physical. Patient reports no acute concerns at this time. Accompanied at the visit with his wife. Pt is UTD with colonoscopy, next due in 2026 with Dr. Ball. Additionally, due for repeat EGD given hx of Randell's, due this year, taking PPI regularly. Regarding, DM2, well controlled, on combination insulin and oral medications.

## 2022-09-11 NOTE — PLAN
[FreeTextEntry1] : 78 yo M with DM2, HTN, HLD, Randell's, presenting for annual physical\par - labs drawn in office\par - DM2 well controlled, continue current management\par - 3 month follow up for DM2\par - colonoscopy UTD, next due 2026\par - due for EGD, patient to follow up with Dr. Ball

## 2022-09-22 LAB
25(OH)D3 SERPL-MCNC: 28.1 NG/ML
ALBUMIN SERPL ELPH-MCNC: 4.5 G/DL
ALP BLD-CCNC: 55 U/L
ALT SERPL-CCNC: 14 U/L
ANION GAP SERPL CALC-SCNC: 10 MMOL/L
APPEARANCE: CLEAR
AST SERPL-CCNC: 23 U/L
BACTERIA UR CULT: NORMAL
BACTERIA: NEGATIVE
BASOPHILS # BLD AUTO: 0.05 K/UL
BASOPHILS NFR BLD AUTO: 0.7 %
BILIRUB SERPL-MCNC: 0.3 MG/DL
BILIRUBIN URINE: NEGATIVE
BLOOD URINE: NEGATIVE
BUN SERPL-MCNC: 17 MG/DL
CALCIUM SERPL-MCNC: 9.6 MG/DL
CHLORIDE SERPL-SCNC: 101 MMOL/L
CHOLEST SERPL-MCNC: 143 MG/DL
CO2 SERPL-SCNC: 23 MMOL/L
COLOR: NORMAL
CREAT SERPL-MCNC: 1.08 MG/DL
EGFR: 70 ML/MIN/1.73M2
EOSINOPHIL # BLD AUTO: 0.16 K/UL
EOSINOPHIL NFR BLD AUTO: 2.4 %
GLUCOSE QUALITATIVE U: NEGATIVE
GLUCOSE SERPL-MCNC: 84 MG/DL
HAV IGM SER QL: NONREACTIVE
HBV SURFACE AB SER QL: NONREACTIVE
HBV SURFACE AG SER QL: NONREACTIVE
HCT VFR BLD CALC: 40.7 %
HCV AB SER QL: NONREACTIVE
HCV S/CO RATIO: 0.13 S/CO
HDLC SERPL-MCNC: 61 MG/DL
HEPATITIS A IGG ANTIBODY: NONREACTIVE
HGB BLD-MCNC: 12.9 G/DL
HIV1+2 AB SPEC QL IA.RAPID: NONREACTIVE
HYALINE CASTS: 0 /LPF
IMM GRANULOCYTES NFR BLD AUTO: 0.3 %
KETONES URINE: NEGATIVE
LDLC SERPL CALC-MCNC: 62 MG/DL
LEUKOCYTE ESTERASE URINE: NEGATIVE
LYMPHOCYTES # BLD AUTO: 1.96 K/UL
LYMPHOCYTES NFR BLD AUTO: 29 %
MAN DIFF?: NORMAL
MCHC RBC-ENTMCNC: 30.1 PG
MCHC RBC-ENTMCNC: 31.7 GM/DL
MCV RBC AUTO: 95.1 FL
MICROSCOPIC-UA: NORMAL
MONOCYTES # BLD AUTO: 0.84 K/UL
MONOCYTES NFR BLD AUTO: 12.4 %
NEUTROPHILS # BLD AUTO: 3.73 K/UL
NEUTROPHILS NFR BLD AUTO: 55.2 %
NITRITE URINE: NEGATIVE
NONHDLC SERPL-MCNC: 82 MG/DL
PH URINE: 6
PLATELET # BLD AUTO: 245 K/UL
POTASSIUM SERPL-SCNC: 5.3 MMOL/L
PROT SERPL-MCNC: 7.6 G/DL
PROTEIN URINE: NEGATIVE
PSA FREE FLD-MCNC: 19 %
PSA FREE SERPL-MCNC: 0.68 NG/ML
PSA SERPL-MCNC: 3.66 NG/ML
RBC # BLD: 4.28 M/UL
RBC # FLD: 13.4 %
RED BLOOD CELLS URINE: 1 /HPF
SODIUM SERPL-SCNC: 133 MMOL/L
SPECIFIC GRAVITY URINE: 1.01
SQUAMOUS EPITHELIAL CELLS: 0 /HPF
T4 FREE SERPL-MCNC: 1.2 NG/DL
TRIGL SERPL-MCNC: 99 MG/DL
TSH SERPL-ACNC: 3.53 UIU/ML
UROBILINOGEN URINE: NORMAL
WBC # FLD AUTO: 6.76 K/UL
WHITE BLOOD CELLS URINE: 0 /HPF

## 2022-10-20 ENCOUNTER — APPOINTMENT (OUTPATIENT)
Dept: INTERNAL MEDICINE | Facility: CLINIC | Age: 77
End: 2022-10-20

## 2022-10-20 PROCEDURE — G0008: CPT

## 2022-10-20 PROCEDURE — 90662 IIV NO PRSV INCREASED AG IM: CPT

## 2023-01-03 ENCOUNTER — RX RENEWAL (OUTPATIENT)
Age: 78
End: 2023-01-03

## 2023-04-25 ENCOUNTER — RX RENEWAL (OUTPATIENT)
Age: 78
End: 2023-04-25

## 2023-05-25 ENCOUNTER — APPOINTMENT (OUTPATIENT)
Dept: FAMILY MEDICINE | Facility: CLINIC | Age: 78
End: 2023-05-25
Payer: MEDICARE

## 2023-05-25 ENCOUNTER — RX RENEWAL (OUTPATIENT)
Age: 78
End: 2023-05-25

## 2023-05-25 VITALS
HEART RATE: 78 BPM | WEIGHT: 182 LBS | BODY MASS INDEX: 29.25 KG/M2 | DIASTOLIC BLOOD PRESSURE: 70 MMHG | SYSTOLIC BLOOD PRESSURE: 132 MMHG | OXYGEN SATURATION: 98 % | HEIGHT: 66 IN

## 2023-05-25 DIAGNOSIS — K21.9 GASTRO-ESOPHAGEAL REFLUX DISEASE W/OUT ESOPHAGITIS: ICD-10-CM

## 2023-05-25 DIAGNOSIS — N17.9 ACUTE KIDNEY FAILURE, UNSPECIFIED: ICD-10-CM

## 2023-05-25 PROCEDURE — 36415 COLL VENOUS BLD VENIPUNCTURE: CPT

## 2023-05-25 PROCEDURE — 99214 OFFICE O/P EST MOD 30 MIN: CPT | Mod: 25

## 2023-05-25 RX ORDER — OMEPRAZOLE 20 MG/1
20 CAPSULE, DELAYED RELEASE ORAL
Qty: 90 | Refills: 3 | Status: ACTIVE | COMMUNITY
Start: 2019-03-27 | End: 1900-01-01

## 2023-05-30 DIAGNOSIS — S80.11XA CONTUSION OF RIGHT LOWER LEG, INITIAL ENCOUNTER: ICD-10-CM

## 2023-05-30 PROBLEM — K21.9 CHRONIC GERD: Status: ACTIVE | Noted: 2019-03-27

## 2023-05-30 PROBLEM — N17.9 AKI (ACUTE KIDNEY INJURY): Status: RESOLVED | Noted: 2022-08-18 | Resolved: 2023-05-30

## 2023-05-30 LAB
25(OH)D3 SERPL-MCNC: 27.1 NG/ML
ALBUMIN SERPL ELPH-MCNC: 4.6 G/DL
ALP BLD-CCNC: 62 U/L
ALT SERPL-CCNC: 12 U/L
ANION GAP SERPL CALC-SCNC: 16 MMOL/L
AST SERPL-CCNC: 18 U/L
BILIRUB SERPL-MCNC: 0.2 MG/DL
BUN SERPL-MCNC: 17 MG/DL
CALCIUM SERPL-MCNC: 10 MG/DL
CHLORIDE SERPL-SCNC: 102 MMOL/L
CHOLEST SERPL-MCNC: 131 MG/DL
CO2 SERPL-SCNC: 21 MMOL/L
CREAT SERPL-MCNC: 1.2 MG/DL
EGFR: 62 ML/MIN/1.73M2
ESTIMATED AVERAGE GLUCOSE: 146 MG/DL
GLUCOSE SERPL-MCNC: 103 MG/DL
HBA1C MFR BLD HPLC: 6.7 %
HDLC SERPL-MCNC: 52 MG/DL
LDLC SERPL CALC-MCNC: 51 MG/DL
NONHDLC SERPL-MCNC: 79 MG/DL
POTASSIUM SERPL-SCNC: 4.9 MMOL/L
PROT SERPL-MCNC: 7.9 G/DL
SODIUM SERPL-SCNC: 139 MMOL/L
TRIGL SERPL-MCNC: 139 MG/DL

## 2023-05-30 NOTE — HEALTH RISK ASSESSMENT
[Yes] : Yes [Monthly or less (1 pt)] : Monthly or less (1 point) [1 or 2 (0 pts)] : 1 or 2 (0 points) [Never (0 pts)] : Never (0 points) [No falls in past year] : Patient reported no falls in the past year [0] : 2) Feeling down, depressed, or hopeless: Not at all (0) [Former] : Former [20 or more] : 20 or more [> 15 Years] : > 15 Years [[id number]] : 0

## 2023-06-01 LAB
FERRITIN SERPL-MCNC: 22 NG/ML
FOLATE SERPL-MCNC: 12.6 NG/ML
IRON SATN MFR SERPL: 18 %
IRON SERPL-MCNC: 74 UG/DL
TIBC SERPL-MCNC: 406 UG/DL
UIBC SERPL-MCNC: 332 UG/DL
VIT B12 SERPL-MCNC: 172 PG/ML

## 2023-06-01 RX ORDER — PNV NO.95/FERROUS FUM/FOLIC AC 28MG-0.8MG
100 TABLET ORAL DAILY
Qty: 90 | Refills: 0 | Status: ACTIVE | COMMUNITY
Start: 2023-06-01 | End: 1900-01-01

## 2023-06-07 ENCOUNTER — APPOINTMENT (OUTPATIENT)
Dept: INTERNAL MEDICINE | Facility: CLINIC | Age: 78
End: 2023-06-07
Payer: MEDICARE

## 2023-06-07 PROCEDURE — 96372 THER/PROPH/DIAG INJ SC/IM: CPT

## 2023-06-07 RX ORDER — CYANOCOBALAMIN 1000 UG/ML
1000 INJECTION INTRAMUSCULAR; SUBCUTANEOUS
Qty: 0 | Refills: 0 | Status: COMPLETED | OUTPATIENT
Start: 2023-06-01

## 2023-08-15 ENCOUNTER — RX RENEWAL (OUTPATIENT)
Age: 78
End: 2023-08-15

## 2023-09-11 ENCOUNTER — APPOINTMENT (OUTPATIENT)
Dept: FAMILY MEDICINE | Facility: CLINIC | Age: 78
End: 2023-09-11
Payer: MEDICARE

## 2023-09-11 VITALS
TEMPERATURE: 97.8 F | OXYGEN SATURATION: 98 % | SYSTOLIC BLOOD PRESSURE: 130 MMHG | HEIGHT: 66 IN | HEART RATE: 74 BPM | BODY MASS INDEX: 27.97 KG/M2 | WEIGHT: 174 LBS | DIASTOLIC BLOOD PRESSURE: 76 MMHG

## 2023-09-11 DIAGNOSIS — H61.21 IMPACTED CERUMEN, RIGHT EAR: ICD-10-CM

## 2023-09-11 DIAGNOSIS — K22.70 BARRETT'S ESOPHAGUS W/OUT DYSPLASIA: ICD-10-CM

## 2023-09-11 DIAGNOSIS — E53.8 DEFICIENCY OF OTHER SPECIFIED B GROUP VITAMINS: ICD-10-CM

## 2023-09-11 DIAGNOSIS — D64.9 ANEMIA, UNSPECIFIED: ICD-10-CM

## 2023-09-11 DIAGNOSIS — E66.3 OVERWEIGHT: ICD-10-CM

## 2023-09-11 DIAGNOSIS — Z00.00 ENCOUNTER FOR GENERAL ADULT MEDICAL EXAMINATION W/OUT ABNORMAL FINDINGS: ICD-10-CM

## 2023-09-11 DIAGNOSIS — E78.5 HYPERLIPIDEMIA, UNSPECIFIED: ICD-10-CM

## 2023-09-11 DIAGNOSIS — E55.9 VITAMIN D DEFICIENCY, UNSPECIFIED: ICD-10-CM

## 2023-09-11 DIAGNOSIS — E78.2 MIXED HYPERLIPIDEMIA: ICD-10-CM

## 2023-09-11 DIAGNOSIS — E61.1 IRON DEFICIENCY: ICD-10-CM

## 2023-09-11 DIAGNOSIS — Z12.5 ENCOUNTER FOR SCREENING FOR MALIGNANT NEOPLASM OF PROSTATE: ICD-10-CM

## 2023-09-11 DIAGNOSIS — F51.01 PRIMARY INSOMNIA: ICD-10-CM

## 2023-09-11 PROCEDURE — 99213 OFFICE O/P EST LOW 20 MIN: CPT | Mod: 25

## 2023-09-11 PROCEDURE — 36415 COLL VENOUS BLD VENIPUNCTURE: CPT

## 2023-09-11 PROCEDURE — G0439: CPT

## 2023-09-11 RX ADMIN — CYANOCOBALAMIN 1 MCG/ML: 1000 INJECTION INTRAMUSCULAR; SUBCUTANEOUS at 00:00

## 2023-10-06 LAB
25(OH)D3 SERPL-MCNC: 28.9 NG/ML
ALBUMIN SERPL ELPH-MCNC: 4.9 G/DL
ALP BLD-CCNC: 62 U/L
ALT SERPL-CCNC: 16 U/L
ANION GAP SERPL CALC-SCNC: 15 MMOL/L
APPEARANCE: CLEAR
AST SERPL-CCNC: 24 U/L
BACTERIA UR CULT: NORMAL
BACTERIA: NEGATIVE /HPF
BILIRUB SERPL-MCNC: 0.3 MG/DL
BILIRUBIN URINE: NEGATIVE
BLOOD URINE: NEGATIVE
BUN SERPL-MCNC: 17 MG/DL
CALCIUM SERPL-MCNC: 10.3 MG/DL
CAST: 0 /LPF
CHLORIDE SERPL-SCNC: 100 MMOL/L
CHOLEST SERPL-MCNC: 140 MG/DL
CO2 SERPL-SCNC: 20 MMOL/L
COLOR: YELLOW
CREAT SERPL-MCNC: 1.24 MG/DL
EGFR: 59 ML/MIN/1.73M2
EPITHELIAL CELLS: 0 /HPF
ESTIMATED AVERAGE GLUCOSE: 128 MG/DL
FERRITIN SERPL-MCNC: 39 NG/ML
FOLATE SERPL-MCNC: 17.1 NG/ML
GLUCOSE QUALITATIVE U: NEGATIVE MG/DL
GLUCOSE SERPL-MCNC: 124 MG/DL
HBA1C MFR BLD HPLC: 6.1 %
HCT VFR BLD CALC: 41.7 %
HDLC SERPL-MCNC: 61 MG/DL
HGB BLD-MCNC: 13.3 G/DL
IRON SATN MFR SERPL: 30 %
IRON SERPL-MCNC: 118 UG/DL
KETONES URINE: NEGATIVE MG/DL
LDLC SERPL CALC-MCNC: 60 MG/DL
LEUKOCYTE ESTERASE URINE: NEGATIVE
MCHC RBC-ENTMCNC: 30.1 PG
MCHC RBC-ENTMCNC: 31.9 GM/DL
MCV RBC AUTO: 94.3 FL
MICROSCOPIC-UA: NORMAL
NITRITE URINE: NEGATIVE
NONHDLC SERPL-MCNC: 79 MG/DL
PH URINE: 5.5
PLATELET # BLD AUTO: 246 K/UL
POTASSIUM SERPL-SCNC: 5.4 MMOL/L
PROT SERPL-MCNC: 8.4 G/DL
PROTEIN URINE: NEGATIVE MG/DL
PSA FREE FLD-MCNC: 17 %
PSA FREE SERPL-MCNC: 0.76 NG/ML
PSA SERPL-MCNC: 4.43 NG/ML
RBC # BLD: 4.42 M/UL
RBC # FLD: 14.2 %
RED BLOOD CELLS URINE: 0 /HPF
SODIUM SERPL-SCNC: 135 MMOL/L
SPECIFIC GRAVITY URINE: 1.01
T4 FREE SERPL-MCNC: 1.3 NG/DL
TIBC SERPL-MCNC: 396 UG/DL
TRIGL SERPL-MCNC: 106 MG/DL
TSH SERPL-ACNC: 3.29 UIU/ML
UIBC SERPL-MCNC: 278 UG/DL
UROBILINOGEN URINE: 0.2 MG/DL
VIT B12 SERPL-MCNC: 271 PG/ML
WBC # FLD AUTO: 6.67 K/UL
WHITE BLOOD CELLS URINE: 0 /HPF

## 2023-10-10 ENCOUNTER — RX RENEWAL (OUTPATIENT)
Age: 78
End: 2023-10-10

## 2023-10-10 RX ORDER — CARVEDILOL PHOSPHATE 20 MG/1
20 CAPSULE, EXTENDED RELEASE ORAL
Qty: 90 | Refills: 3 | Status: ACTIVE | COMMUNITY
Start: 2019-03-27 | End: 1900-01-01

## 2023-10-11 ENCOUNTER — APPOINTMENT (OUTPATIENT)
Dept: INTERNAL MEDICINE | Facility: CLINIC | Age: 78
End: 2023-10-11
Payer: MEDICARE

## 2023-10-11 PROCEDURE — G0008: CPT

## 2023-10-11 PROCEDURE — 90662 IIV NO PRSV INCREASED AG IM: CPT

## 2024-01-09 ENCOUNTER — RX RENEWAL (OUTPATIENT)
Age: 79
End: 2024-01-09

## 2024-02-15 ENCOUNTER — RX RENEWAL (OUTPATIENT)
Age: 79
End: 2024-02-15

## 2024-02-15 RX ORDER — PIOGLITAZONE HYDROCHLORIDE AND METFORMIN HYDROCHLORIDE 15; 850 MG/1; MG/1
15-850 TABLET, FILM COATED ORAL
Qty: 180 | Refills: 3 | Status: ACTIVE | COMMUNITY
Start: 2019-11-23 | End: 1900-01-01

## 2024-02-26 ENCOUNTER — APPOINTMENT (OUTPATIENT)
Dept: FAMILY MEDICINE | Facility: CLINIC | Age: 79
End: 2024-02-26
Payer: MEDICARE

## 2024-02-26 DIAGNOSIS — I10 ESSENTIAL (PRIMARY) HYPERTENSION: ICD-10-CM

## 2024-02-26 DIAGNOSIS — E11.9 TYPE 2 DIABETES MELLITUS W/OUT COMPLICATIONS: ICD-10-CM

## 2024-02-26 DIAGNOSIS — M17.10 UNILATERAL PRIMARY OSTEOARTHRITIS, UNSPECIFIED KNEE: ICD-10-CM

## 2024-02-26 PROCEDURE — 36415 COLL VENOUS BLD VENIPUNCTURE: CPT

## 2024-02-26 PROCEDURE — G2211 COMPLEX E/M VISIT ADD ON: CPT

## 2024-02-26 PROCEDURE — 99214 OFFICE O/P EST MOD 30 MIN: CPT

## 2024-02-26 RX ORDER — INSULIN DETEMIR 100 [IU]/ML
100 INJECTION, SOLUTION SUBCUTANEOUS
Qty: 1 | Refills: 2 | Status: ACTIVE | COMMUNITY
Start: 2019-02-15 | End: 1900-01-01

## 2024-02-26 RX ORDER — FERROUS SULFATE 325(65) MG
325 (65 FE) TABLET ORAL
Qty: 45 | Refills: 0 | Status: DISCONTINUED | COMMUNITY
Start: 2023-06-01 | End: 2024-02-26

## 2024-02-27 VITALS
SYSTOLIC BLOOD PRESSURE: 142 MMHG | HEART RATE: 84 BPM | OXYGEN SATURATION: 98 % | HEIGHT: 66 IN | BODY MASS INDEX: 29.25 KG/M2 | DIASTOLIC BLOOD PRESSURE: 70 MMHG | WEIGHT: 182 LBS

## 2024-02-27 PROBLEM — E11.9 DIABETES: Status: ACTIVE | Noted: 2019-10-22

## 2024-02-27 PROBLEM — I10 ESSENTIAL HYPERTENSION: Status: ACTIVE | Noted: 2019-10-22

## 2024-02-27 NOTE — PLAN
[FreeTextEntry1] : 77 yo M with DM2, on insulin, HTN, HLD, presenting for DM2 follow up DM2 - labs drawn in office, levemir renewed, follow up 3-6 months, maintaining glucose well HTN - BP at goal, continue medications All questions answered

## 2024-02-27 NOTE — HISTORY OF PRESENT ILLNESS
[FreeTextEntry1] : Follow up [de-identified] : 79 yo M with DM2, on insulin, HTN, HLD, presenting for DM2 follow up. Patient reports no acute concerns at this time. Taking all medications as prescribed. Patient needs refill on levemir. Denies any hypoglycemic events. Last Hgb A1c was 6.1%.

## 2024-02-27 NOTE — HEALTH RISK ASSESSMENT
[Yes] : Yes [Monthly or less (1 pt)] : Monthly or less (1 point) [1 or 2 (0 pts)] : 1 or 2 (0 points) [Never (0 pts)] : Never (0 points) [No falls in past year] : Patient reported no falls in the past year [0] : 2) Feeling down, depressed, or hopeless: Not at all (0) [Former] : Former [5-9] : 5-9 [> 15 Years] : > 15 Years [[id number]] : 0

## 2024-03-11 ENCOUNTER — APPOINTMENT (OUTPATIENT)
Dept: FAMILY MEDICINE | Facility: CLINIC | Age: 79
End: 2024-03-11

## 2024-04-01 ENCOUNTER — RESULT REVIEW (OUTPATIENT)
Age: 79
End: 2024-04-01

## 2024-04-01 ENCOUNTER — APPOINTMENT (OUTPATIENT)
Dept: FAMILY MEDICINE | Facility: CLINIC | Age: 79
End: 2024-04-01
Payer: MEDICARE

## 2024-04-01 VITALS
BODY MASS INDEX: 28.93 KG/M2 | TEMPERATURE: 98.6 F | HEART RATE: 83 BPM | DIASTOLIC BLOOD PRESSURE: 70 MMHG | SYSTOLIC BLOOD PRESSURE: 126 MMHG | OXYGEN SATURATION: 100 % | WEIGHT: 180 LBS | HEIGHT: 66 IN

## 2024-04-01 LAB
ALBUMIN SERPL ELPH-MCNC: 4.8 G/DL
ALP BLD-CCNC: 57 U/L
ALT SERPL-CCNC: 15 U/L
ANION GAP SERPL CALC-SCNC: 14 MMOL/L
AST SERPL-CCNC: 19 U/L
BILIRUB SERPL-MCNC: 0.2 MG/DL
BUN SERPL-MCNC: 17 MG/DL
CALCIUM SERPL-MCNC: 9.8 MG/DL
CHLORIDE SERPL-SCNC: 102 MMOL/L
CO2 SERPL-SCNC: 21 MMOL/L
CREAT SERPL-MCNC: 1.22 MG/DL
EGFR: 60 ML/MIN/1.73M2
ESTIMATED AVERAGE GLUCOSE: 143 MG/DL
GLUCOSE SERPL-MCNC: 114 MG/DL
HBA1C MFR BLD HPLC: 6.6 %
POTASSIUM SERPL-SCNC: 4.7 MMOL/L
PROT SERPL-MCNC: 7.8 G/DL
SODIUM SERPL-SCNC: 137 MMOL/L

## 2024-04-01 PROCEDURE — G2211 COMPLEX E/M VISIT ADD ON: CPT

## 2024-04-01 PROCEDURE — 99214 OFFICE O/P EST MOD 30 MIN: CPT

## 2024-04-01 RX ORDER — INSULIN GLARGINE 300 U/ML
300 INJECTION, SOLUTION SUBCUTANEOUS DAILY
Qty: 1 | Refills: 1 | Status: ACTIVE | COMMUNITY
Start: 2024-04-01 | End: 1900-01-01

## 2024-04-02 NOTE — PLAN
[FreeTextEntry1] : Will obtain xray imaging of left foot and ankle and refer to podiatry for additional consultation/evaluation  Patient needed alternative to Levemir insulin ordered as prescription no longer covered by insurance.  All questions answered.

## 2024-04-02 NOTE — PHYSICAL EXAM
[Normal] : affect was normal and insight and judgment were intact [de-identified] : Left ankle: mild soft tissue swelling of lateral ankle, FROM intact, calcaneal tenderness, Achilles intact

## 2024-04-02 NOTE — HISTORY OF PRESENT ILLNESS
[FreeTextEntry8] : 79 yo M following up for left ankle swelling and discomfort and heel pain that has not resolved over the last four weeks. Patient has been using heel inserts with no significant improvement. Patient denies any traumatic event, or fall, or redness or warmth of the ankle either.

## 2024-04-05 ENCOUNTER — APPOINTMENT (OUTPATIENT)
Dept: PODIATRY | Facility: CLINIC | Age: 79
End: 2024-04-05
Payer: MEDICARE

## 2024-04-05 VITALS — HEIGHT: 66 IN | WEIGHT: 182 LBS | BODY MASS INDEX: 29.25 KG/M2

## 2024-04-05 DIAGNOSIS — M77.32 CALCANEAL SPUR, LEFT FOOT: ICD-10-CM

## 2024-04-05 PROCEDURE — 99203 OFFICE O/P NEW LOW 30 MIN: CPT

## 2024-04-05 NOTE — PHYSICAL EXAM
[General Appearance - Alert] : alert [General Appearance - In No Acute Distress] : in no acute distress [FreeTextEntry3] : The vascular exam reveals decreased pedal pulses bilateral, a capillary fill time of 3-5 seconds,  mild atrophic skin changes, mild varicosities absence of hair growth, but no cyanosis, clubbing or mottling seen. [de-identified] : no bruising, no ecchymosis, no breaks in the skin, no evidence of plantar fibromas noted., no history of injury or trauma, reproducible pain upon palpation of the plantar aspect of the calcaneus without medial lateral squeeze pain, pain along the medial band of the plantar fascia and insertion of the plantar fascia to calcaneus, patient able to walk on the heels but does admit to pain, admits to post-static dyskinesia., admits to pain at the end of the day., no pain to the posterior aspect of the calcaneus, no evidence of Amita's deformity, no void felt in the Achilles tendon, patient able to walk on there toes without pain, patient able to walk on there toes but with pain, denies numbness tingling and sharp shooting pains, no evidence of a Tinel's sign upon percussion of the posterior tibial nerve   [Skin Color & Pigmentation] : normal skin color and pigmentation [Skin Turgor] : normal skin turgor [] : no rash [Skin Lesions] : no skin lesions [Foot Ulcer] : no foot ulcer [Skin Induration] : no skin induration [Sensation] : the sensory exam was normal to light touch and pinprick [No Focal Deficits] : no focal deficits [Deep Tendon Reflexes (DTR)] : deep tendon reflexes were 2+ and symmetric [Motor Exam] : the motor exam was normal [Oriented To Time, Place, And Person] : oriented to person, place, and time [Impaired Insight] : insight and judgment were intact [Affect] : the affect was normal

## 2024-04-05 NOTE — REVIEW OF SYSTEMS
[Leg Claudication] : no intermittent leg claudication [Lower Ext Edema] : no extremity edema [As Noted in HPI] : as noted in HPI [Negative] : Endocrine

## 2024-04-05 NOTE — HISTORY OF PRESENT ILLNESS
[FreeTextEntry1] : Location: left heel Duration:2 months Etiology: unknown Past Tx: PCP, heel lifts Exacerbated by:weight bearing.walking Prior Hx: no

## 2024-04-05 NOTE — PROCEDURE
[FreeTextEntry1] : reviewed xrays xrays reveal an inferior calcaneal spur, otherwise negative study Description of problem and treatment as follows: I had a lengthy discussion with the patient regarding the diagnosis, etiology, differential diagnosis as well as treatment options for heel and arch pain.  Risks alternatives and benefits of these treatments range from conservative to surgical were explained in great detail and compared as to other orthopedic problems. I also explained the progression of treatment from conservative to possible surgical treatment options as well as the benefits of each. They included, but were not limited to : anti inflammatories by mouth, PT, bracing and splinting (orthotics for this problem) steroid injections, immobilization, arthroscopic tx (tenex) and open surgical repair.   I do stress conservative treatment if in fact conservative treatment is an option until it no longer provides relief. Over-the-counter products medications padding, and splinting were reviewed as well. All questions asked and answered appropriately to the patient's satisfaction. I had a lengthy and informative discussion with the patient today regarding plantar fasciitis. I explained to the patient that there are several etiologies as well contributing factors to this problem as well as what can aggravate it. It could include the presence or lack of of a heel spur, the type of foot type they have, the way they walk, it also could be related to the type of shoes they wear. I also made them understand that it could be a combination of all these problems together. I explained etiologies treatment options both conservative and surgical. I gave educational literature regarding this problem. . I did explain to the patient the type of shoe gear this should be wearing and gave him a copy of my plantar fascia stretching exercises with instructions to ice afterwards. A complete and thorough evaluation of the type of shoes they should be wearing and type of shoes for this time of year was discussed with patient. The above-named patient had a thorough explanation of the prescription electronically prescribed and what they should expect and when they should expect to see the change. I also advised the patient if they have any side effects of the prescription that they should contact the office and we should discuss it immediately. Risks and benefits of the prescription was reviewed and questions asked and answered appropriately Since the rx is for non-steroidal anti-inflammatory medication I had a complete and thorough discussion with the patient regarding risks and benefits of these types of medications. The most common side effects include but are not limited to gastrointestinal upset, blood in the stool, nausea, diarrhea, as well as tinnitus. I have advised the patient that if they should experience any of the side effects that they should discontinue them at once and contacted primary care physician for immediate follow up. I did advise the patient that these types of medications can be taken on an as needed basis and if they are not having pain he should not take them. I also advised that the patient should follow the explicit instructions on the label and not to exceed the recommended dosage. The patient was advised formal physical therapy is critical at this point and that I recommend it in order to try and achieve best possible outcome to their problem. Rx has been supplied. i have dispensed a pair of heel cushions to the patient and advised the patient that accommodative support as well as elevation of both heels to help reduce symptoms follow up appt 3 weeks

## 2024-04-08 ENCOUNTER — RX RENEWAL (OUTPATIENT)
Age: 79
End: 2024-04-08

## 2024-04-08 RX ORDER — SIMVASTATIN 20 MG/1
20 TABLET, FILM COATED ORAL
Qty: 90 | Refills: 3 | Status: ACTIVE | COMMUNITY
Start: 2019-06-15 | End: 1900-01-01

## 2024-04-08 RX ORDER — RAMIPRIL 10 MG/1
10 CAPSULE ORAL
Qty: 90 | Refills: 3 | Status: ACTIVE | COMMUNITY
Start: 2019-06-15 | End: 1900-01-01

## 2024-04-08 RX ORDER — ZOLPIDEM TARTRATE 10 MG/1
10 TABLET ORAL
Qty: 30 | Refills: 2 | Status: ACTIVE | COMMUNITY
Start: 2019-03-11 | End: 1900-01-01

## 2024-04-26 ENCOUNTER — APPOINTMENT (OUTPATIENT)
Dept: PODIATRY | Facility: CLINIC | Age: 79
End: 2024-04-26
Payer: MEDICARE

## 2024-04-26 DIAGNOSIS — M25.572 PAIN IN LEFT ANKLE AND JOINTS OF LEFT FOOT: ICD-10-CM

## 2024-04-26 DIAGNOSIS — M79.672 PAIN IN LEFT FOOT: ICD-10-CM

## 2024-04-26 PROCEDURE — 99213 OFFICE O/P EST LOW 20 MIN: CPT | Mod: 25

## 2024-04-26 PROCEDURE — 20550 NJX 1 TENDON SHEATH/LIGAMENT: CPT | Mod: LT

## 2024-04-26 RX ORDER — METHYLPRED ACET/NACL,ISO-OS/PF 40 MG/ML
40 VIAL (ML) INJECTION
Qty: 0 | Refills: 0 | Status: COMPLETED | OUTPATIENT
Start: 2024-04-26

## 2024-04-26 RX ORDER — DICLOFENAC SODIUM 75 MG/1
75 TABLET, DELAYED RELEASE ORAL
Qty: 60 | Refills: 1 | Status: ACTIVE | COMMUNITY
Start: 2024-04-05 | End: 1900-01-01

## 2024-04-26 RX ADMIN — METHYLPREDNISOLONE ACETATE 40 MG/ML: 40 INJECTION, SUSPENSION INTRA-ARTICULAR; INTRALESIONAL; INTRAMUSCULAR; SOFT TISSUE at 00:00

## 2024-04-26 NOTE — HISTORY OF PRESENT ILLNESS
[FreeTextEntry1] : Location: left heel Duration:2 months Etiology: unknown Past Tx: PCP, heel lifts Exacerbated by:weight bearing.walking Prior Hx: no  my recent treatment : NSAIDs and physical therapy and heel cushions.  Patient never went to physical therapy and was not consistent with the nonsteroidal medications Presents with his wife to discuss additional treatment options

## 2024-04-26 NOTE — REVIEW OF SYSTEMS
[Leg Claudication] : no intermittent leg claudication [Lower Ext Edema] : no extremity edema [As Noted in HPI] : as noted in HPI [Negative] : Integumentary

## 2024-04-26 NOTE — PROCEDURE
[FreeTextEntry1] : All diagnostic test, labs, imaging, prior notes and reports (both new and recent) reviewed prior to seeing the patient and discussed at length face to face with the patient. How these results pertain to the patient, their diagnosis and treatments also reviewed. All questions asked and answered appropriately. The risks and complications of not following my recommendations d/w the patient. The patient was advised formal physical therapy is critical at this point and that I recommend it in order to try and achieve best possible outcome to their problem. Rx has been supplied. A lengthy discussion with the patient regarding risks and benefits of steroid injection. I explained to the patient that in the natural progression of orthopedic type problems oral anti-inflammatories and injectable anti-inflammatory medication or an integral part of the treatment process. I did explain to the patient some of the risks associated with steroid injection included but were not limited to infection at the puncture site, discoloration of skin, the condition being no better, and most importantly the risk of soft tissue injury. I did explain to the patient, in certain rare occasions, there is soft tissue damage including but not limited to atrophy of the soft tissue, necrosis of the tissue, rupture of the ligament, and or possibly rupture of the tendon. After weighing the risks alternatives and benefits to the injection the patient agreed to proceed. All questions asked and answered appropriately. During the evaluation and management I had a lengthy discussion with the patient regarding benefits of functional foot orthoses. I explained to the patient the etiology and treatment options and one of them included the offloading and balancing of the painful portion of the foot. I explained the importance of balancing in offloading the painful area as part of the overall treatment process to advance healing. I have asked the patient to consider this as part of the treatment After obtaining verbal consent and a lengthy discussion regarding risks alternatives and benefits the patient had approximately 2 cc of 0.5% Marcaine plain mixed with 2 cc of 1% lidocaine plain 1 cc of Depo-Medrol 40 mg per cc. The medial aspect of the left heel was cleansed with alcohol painted with Betadine and using ethyl chloride and approach and a local infiltrated in and about the plantar fascia to calcaneal insertion area. It was a local infiltrated block the patient tolerated well left the office with vital signs stable vascular status intact. follow up appt 4 weeks

## 2024-04-26 NOTE — PHYSICAL EXAM
[General Appearance - Alert] : alert [General Appearance - In No Acute Distress] : in no acute distress [FreeTextEntry3] : The vascular exam reveals decreased pedal pulses bilateral, a capillary fill time of 3-5 seconds,  mild atrophic skin changes, mild varicosities absence of hair growth, but no cyanosis, clubbing or mottling seen. [de-identified] : no bruising, no ecchymosis, no breaks in the skin, no evidence of plantar fibromas noted., no history of injury or trauma, reproducible pain upon palpation of the plantar aspect of the calcaneus without medial lateral squeeze pain, pain along the medial band of the plantar fascia and insertion of the plantar fascia to calcaneus, patient able to walk on the heels but does admit to pain, admits to post-static dyskinesia., admits to pain at the end of the day., no pain to the posterior aspect of the calcaneus, no evidence of Amita's deformity, no void felt in the Achilles tendon, patient able to walk on there toes without pain, patient able to walk on there toes but with pain, denies numbness tingling and sharp shooting pains, no evidence of a Tinel's sign upon percussion of the posterior tibial nerve   [Skin Color & Pigmentation] : normal skin color and pigmentation [Skin Turgor] : normal skin turgor [] : no rash [Skin Lesions] : no skin lesions [Foot Ulcer] : no foot ulcer [Skin Induration] : no skin induration [Sensation] : the sensory exam was normal to light touch and pinprick [No Focal Deficits] : no focal deficits [Deep Tendon Reflexes (DTR)] : deep tendon reflexes were 2+ and symmetric [Motor Exam] : the motor exam was normal

## 2024-05-16 ENCOUNTER — RX RENEWAL (OUTPATIENT)
Age: 79
End: 2024-05-16

## 2024-05-16 RX ORDER — SITAGLIPTIN 100 MG/1
100 TABLET, FILM COATED ORAL
Qty: 90 | Refills: 3 | Status: ACTIVE | COMMUNITY
Start: 2021-03-03 | End: 1900-01-01

## 2024-05-28 ENCOUNTER — APPOINTMENT (OUTPATIENT)
Dept: PODIATRY | Facility: CLINIC | Age: 79
End: 2024-05-28

## 2024-08-23 ENCOUNTER — RX RENEWAL (OUTPATIENT)
Age: 79
End: 2024-08-23

## 2024-09-09 ENCOUNTER — RX RENEWAL (OUTPATIENT)
Age: 79
End: 2024-09-09

## 2024-09-16 ENCOUNTER — LABORATORY RESULT (OUTPATIENT)
Age: 79
End: 2024-09-16

## 2024-09-16 ENCOUNTER — APPOINTMENT (OUTPATIENT)
Dept: FAMILY MEDICINE | Facility: CLINIC | Age: 79
End: 2024-09-16
Payer: MEDICARE

## 2024-09-16 VITALS
WEIGHT: 161 LBS | HEART RATE: 86 BPM | DIASTOLIC BLOOD PRESSURE: 64 MMHG | SYSTOLIC BLOOD PRESSURE: 140 MMHG | TEMPERATURE: 97.8 F | OXYGEN SATURATION: 95 % | HEIGHT: 66 IN | BODY MASS INDEX: 25.88 KG/M2

## 2024-09-16 DIAGNOSIS — E78.5 HYPERLIPIDEMIA, UNSPECIFIED: ICD-10-CM

## 2024-09-16 DIAGNOSIS — D64.9 ANEMIA, UNSPECIFIED: ICD-10-CM

## 2024-09-16 DIAGNOSIS — F51.01 PRIMARY INSOMNIA: ICD-10-CM

## 2024-09-16 DIAGNOSIS — Z12.5 ENCOUNTER FOR SCREENING FOR MALIGNANT NEOPLASM OF PROSTATE: ICD-10-CM

## 2024-09-16 DIAGNOSIS — Z23 ENCOUNTER FOR IMMUNIZATION: ICD-10-CM

## 2024-09-16 DIAGNOSIS — M17.10 UNILATERAL PRIMARY OSTEOARTHRITIS, UNSPECIFIED KNEE: ICD-10-CM

## 2024-09-16 DIAGNOSIS — E53.8 DEFICIENCY OF OTHER SPECIFIED B GROUP VITAMINS: ICD-10-CM

## 2024-09-16 DIAGNOSIS — K21.9 GASTRO-ESOPHAGEAL REFLUX DISEASE W/OUT ESOPHAGITIS: ICD-10-CM

## 2024-09-16 DIAGNOSIS — I10 ESSENTIAL (PRIMARY) HYPERTENSION: ICD-10-CM

## 2024-09-16 DIAGNOSIS — E78.2 MIXED HYPERLIPIDEMIA: ICD-10-CM

## 2024-09-16 DIAGNOSIS — Z00.00 ENCOUNTER FOR GENERAL ADULT MEDICAL EXAMINATION W/OUT ABNORMAL FINDINGS: ICD-10-CM

## 2024-09-16 DIAGNOSIS — K22.70 BARRETT'S ESOPHAGUS W/OUT DYSPLASIA: ICD-10-CM

## 2024-09-16 DIAGNOSIS — E11.9 TYPE 2 DIABETES MELLITUS W/OUT COMPLICATIONS: ICD-10-CM

## 2024-09-16 DIAGNOSIS — E66.3 OVERWEIGHT: ICD-10-CM

## 2024-09-16 DIAGNOSIS — E55.9 VITAMIN D DEFICIENCY, UNSPECIFIED: ICD-10-CM

## 2024-09-16 PROCEDURE — 96372 THER/PROPH/DIAG INJ SC/IM: CPT

## 2024-09-16 PROCEDURE — 90662 IIV NO PRSV INCREASED AG IM: CPT

## 2024-09-16 PROCEDURE — G0008: CPT

## 2024-09-16 PROCEDURE — 36415 COLL VENOUS BLD VENIPUNCTURE: CPT

## 2024-09-16 PROCEDURE — G0439: CPT

## 2024-09-16 RX ADMIN — CYANOCOBALAMIN 0 MCG/ML: 1000 INJECTION INTRAMUSCULAR; SUBCUTANEOUS at 00:00

## 2024-09-16 NOTE — HEALTH RISK ASSESSMENT
[Yes] : Yes [Monthly or less (1 pt)] : Monthly or less (1 point) [1 or 2 (0 pts)] : 1 or 2 (0 points) [Never (0 pts)] : Never (0 points) [No falls in past year] : Patient reported no falls in the past year [0] : 2) Feeling down, depressed, or hopeless: Not at all (0) [Former] : Former [10-14] : 10-14 [> 15 Years] : > 15 Years [Patient reported colonoscopy was normal] : Patient reported colonoscopy was normal [Fully functional (bathing, dressing, toileting, transferring, walking, feeding)] : Fully functional (bathing, dressing, toileting, transferring, walking, feeding) [Fully functional (using the telephone, shopping, preparing meals, housekeeping, doing laundry, using] : Fully functional and needs no help or supervision to perform IADLs (using the telephone, shopping, preparing meals, housekeeping, doing laundry, using transportation, managing medications and managing finances) [Good] : ~his/her~  mood as  good [PHQ-2 Negative - No further assessment needed] : PHQ-2 Negative - No further assessment needed [de-identified] : podiatry [Audit-CScore] : 1 [de-identified] : walking [de-identified] : regular [[id number]] : 0 [None] : None [With Significant Other] : lives with significant other [With Family] : lives with family [Retired] : retired [] :  [Feels Safe at Home] : Feels safe at home [Reports changes in hearing] : Reports no changes in hearing [Reports changes in vision] : Reports no changes in vision [Reports normal functional visual acuity (ie: able to read med bottle)] : Reports normal functional visual acuity [ColonoscopyDate] : 12/19

## 2024-09-16 NOTE — PLAN
[FreeTextEntry1] : Patient is an 78 yo M with HTN, HLD, DM2, Vitamin D deficiency, Vitamin B12 deficiency, iron deficiency anemia and GERD, presenting for routine annual physical. Patient reports no acute concerns or complaints at this time. Denies headaches, chest pain, shortness of breath, nausea/vomiting, diarrhea, constipation, changes in vision. Patient does endorse drinking about 6 pack of beer multiple times a week. Last colonoscopy in 2019, Dr. Ball, notable for diverticulosis and internal hemorrhoids, denies any recent bleeding. Wants flu shot today. DM2 - well controlled on current regimen, last a1c 6.6%, continue medications, labs drawn in office HTN - BP well controlled, continue medications, labs drawn in office HLD - on statin, labs checked B12 deficiency - b12 injection given today, labs drawn prior GERD - continue PPI, hx of Barretts All questions answered Labs drawn in office Exam reassuring Flu shot today 6 month follow up for DM2, well controlled       Patient present for influenza vaccination as ordered by . Prior to administration, reviewed influenza VIS with patient who verbalized understanding and consent.   Pt denies previous allergic reactions and Guillain [place] syndrome.   MANU: [employer] [id number] LOT:[id number] EXP:06/01/2025  Patient tolerated vaccination well in left  deltoid. No immediate adverse reaction noted.   VIS sent home with patient as per protocol.  Patient presented for Vitamin B12 (cyanocobalamin) 1000mcg/ml injection as ordered by . Patient denied severe allergic reaction prior to vitamin B12 injections.  : [employer] NDC: [id number] LOT:[id number] EXP:04/01/2026  Patient tolerated IM administration well in right deltoid. No immediate adverse reaction noted.   Teri James RN

## 2024-09-16 NOTE — HISTORY OF PRESENT ILLNESS
[FreeTextEntry1] : Annual [de-identified] : Patient is an 80 yo M with HTN, HLD, DM2, Vitamin D deficiency, Vitamin B12 deficiency, iron deficiency anemia and GERD, presenting for routine annual physical. Patient reports no acute concerns or complaints at this time. Denies headaches, chest pain, shortness of breath, nausea/vomiting, diarrhea, constipation, changes in vision. Patient does endorse drinking about 6 pack of beer multiple times a week. Last colonoscopy in 2019, Dr. Ball, notable for diverticulosis and internal hemorrhoids, denies any recent bleeding. Wants flu shot today.

## 2024-09-17 LAB
25(OH)D3 SERPL-MCNC: 40.4 NG/ML
ALBUMIN SERPL ELPH-MCNC: 4.5 G/DL
ALP BLD-CCNC: 57 U/L
ALT SERPL-CCNC: 21 U/L
ANION GAP SERPL CALC-SCNC: 16 MMOL/L
APPEARANCE: CLEAR
AST SERPL-CCNC: 27 U/L
BASOPHILS # BLD AUTO: 0.07 K/UL
BASOPHILS NFR BLD AUTO: 1.1 %
BILIRUB SERPL-MCNC: 0.2 MG/DL
BILIRUBIN URINE: NEGATIVE
BLOOD URINE: NEGATIVE
BUN SERPL-MCNC: 24 MG/DL
CALCIUM SERPL-MCNC: 10 MG/DL
CHLORIDE SERPL-SCNC: 99 MMOL/L
CHOLEST SERPL-MCNC: 157 MG/DL
CO2 SERPL-SCNC: 20 MMOL/L
COLOR: YELLOW
CREAT SERPL-MCNC: 1.27 MG/DL
CREAT SPEC-SCNC: 136 MG/DL
EGFR: 57 ML/MIN/1.73M2
EOSINOPHIL # BLD AUTO: 0.14 K/UL
EOSINOPHIL NFR BLD AUTO: 2.3 %
ESTIMATED AVERAGE GLUCOSE: 131 MG/DL
FERRITIN SERPL-MCNC: 25 NG/ML
FOLATE SERPL-MCNC: 11.2 NG/ML
GLUCOSE QUALITATIVE U: 100 MG/DL
GLUCOSE SERPL-MCNC: 112 MG/DL
HBA1C MFR BLD HPLC: 6.2 %
HCT VFR BLD CALC: 37.8 %
HDLC SERPL-MCNC: 60 MG/DL
HGB BLD-MCNC: 12.1 G/DL
IMM GRANULOCYTES NFR BLD AUTO: 0.2 %
IRON SATN MFR SERPL: 16 %
IRON SERPL-MCNC: 63 UG/DL
KETONES URINE: NEGATIVE MG/DL
LDLC SERPL CALC-MCNC: 73 MG/DL
LEUKOCYTE ESTERASE URINE: NEGATIVE
LYMPHOCYTES # BLD AUTO: 1.65 K/UL
LYMPHOCYTES NFR BLD AUTO: 27 %
MAN DIFF?: NORMAL
MCHC RBC-ENTMCNC: 30.3 PG
MCHC RBC-ENTMCNC: 32 GM/DL
MCV RBC AUTO: 94.7 FL
MICROALBUMIN 24H UR DL<=1MG/L-MCNC: <1.2 MG/DL
MICROALBUMIN/CREAT 24H UR-RTO: NORMAL MG/G
MONOCYTES # BLD AUTO: 0.86 K/UL
MONOCYTES NFR BLD AUTO: 14.1 %
NEUTROPHILS # BLD AUTO: 3.37 K/UL
NEUTROPHILS NFR BLD AUTO: 55.3 %
NITRITE URINE: NEGATIVE
NONHDLC SERPL-MCNC: 97 MG/DL
PH URINE: 5.5
PLATELET # BLD AUTO: 223 K/UL
POTASSIUM SERPL-SCNC: 5.1 MMOL/L
PROT SERPL-MCNC: 8.1 G/DL
PROTEIN URINE: NEGATIVE MG/DL
PSA FREE FLD-MCNC: 18 %
PSA FREE SERPL-MCNC: 1.06 NG/ML
PSA SERPL-MCNC: 5.82 NG/ML
RBC # BLD: 3.99 M/UL
RBC # FLD: 13.8 %
SODIUM SERPL-SCNC: 135 MMOL/L
SPECIFIC GRAVITY URINE: 1.02
TIBC SERPL-MCNC: 401 UG/DL
TRIGL SERPL-MCNC: 145 MG/DL
TSH SERPL-ACNC: 4.67 UIU/ML
UIBC SERPL-MCNC: 337 UG/DL
UROBILINOGEN URINE: 0.2 MG/DL
VIT B12 SERPL-MCNC: 169 PG/ML
WBC # FLD AUTO: 6.1 K/UL

## 2024-10-24 ENCOUNTER — RX RENEWAL (OUTPATIENT)
Age: 79
End: 2024-10-24

## 2024-12-09 ENCOUNTER — RX RENEWAL (OUTPATIENT)
Age: 79
End: 2024-12-09

## 2025-03-17 ENCOUNTER — APPOINTMENT (OUTPATIENT)
Dept: FAMILY MEDICINE | Facility: CLINIC | Age: 80
End: 2025-03-17
Payer: MEDICARE

## 2025-03-17 ENCOUNTER — LABORATORY RESULT (OUTPATIENT)
Age: 80
End: 2025-03-17

## 2025-03-17 VITALS
HEART RATE: 77 BPM | BODY MASS INDEX: 29.25 KG/M2 | DIASTOLIC BLOOD PRESSURE: 60 MMHG | TEMPERATURE: 97.1 F | HEIGHT: 66 IN | WEIGHT: 182 LBS | SYSTOLIC BLOOD PRESSURE: 130 MMHG | OXYGEN SATURATION: 98 %

## 2025-03-17 DIAGNOSIS — E11.9 TYPE 2 DIABETES MELLITUS W/OUT COMPLICATIONS: ICD-10-CM

## 2025-03-17 DIAGNOSIS — E78.5 HYPERLIPIDEMIA, UNSPECIFIED: ICD-10-CM

## 2025-03-17 DIAGNOSIS — E61.1 IRON DEFICIENCY: ICD-10-CM

## 2025-03-17 DIAGNOSIS — E53.8 DEFICIENCY OF OTHER SPECIFIED B GROUP VITAMINS: ICD-10-CM

## 2025-03-17 DIAGNOSIS — I10 ESSENTIAL (PRIMARY) HYPERTENSION: ICD-10-CM

## 2025-03-17 PROCEDURE — G2211 COMPLEX E/M VISIT ADD ON: CPT

## 2025-03-17 PROCEDURE — 99214 OFFICE O/P EST MOD 30 MIN: CPT

## 2025-03-17 PROCEDURE — 36415 COLL VENOUS BLD VENIPUNCTURE: CPT

## 2025-03-21 LAB
ALBUMIN SERPL ELPH-MCNC: 4.5 G/DL
ALP BLD-CCNC: 65 U/L
ALT SERPL-CCNC: 22 U/L
ANION GAP SERPL CALC-SCNC: 14 MMOL/L
AST SERPL-CCNC: 24 U/L
BASOPHILS # BLD AUTO: 0.06 K/UL
BASOPHILS NFR BLD AUTO: 1 %
BILIRUB SERPL-MCNC: 0.2 MG/DL
BUN SERPL-MCNC: 26 MG/DL
CALCIUM SERPL-MCNC: 10 MG/DL
CHLORIDE SERPL-SCNC: 102 MMOL/L
CHOLEST SERPL-MCNC: 132 MG/DL
CO2 SERPL-SCNC: 20 MMOL/L
CREAT SERPL-MCNC: 1.38 MG/DL
EGFRCR SERPLBLD CKD-EPI 2021: 51 ML/MIN/1.73M2
EOSINOPHIL # BLD AUTO: 0.21 K/UL
EOSINOPHIL NFR BLD AUTO: 3.4 %
ESTIMATED AVERAGE GLUCOSE: 148 MG/DL
FERRITIN SERPL-MCNC: 24 NG/ML
FOLATE SERPL-MCNC: 10.9 NG/ML
GLUCOSE SERPL-MCNC: 109 MG/DL
HBA1C MFR BLD HPLC: 6.8 %
HCT VFR BLD CALC: 37.2 %
HDLC SERPL-MCNC: 50 MG/DL
HGB BLD-MCNC: 11.7 G/DL
IMM GRANULOCYTES NFR BLD AUTO: 0.3 %
IRON SATN MFR SERPL: 18 %
IRON SERPL-MCNC: 78 UG/DL
LDLC SERPL-MCNC: 59 MG/DL
LYMPHOCYTES # BLD AUTO: 1.49 K/UL
LYMPHOCYTES NFR BLD AUTO: 24 %
MAN DIFF?: NORMAL
MCHC RBC-ENTMCNC: 28.7 PG
MCHC RBC-ENTMCNC: 31.5 G/DL
MCV RBC AUTO: 91.4 FL
MONOCYTES # BLD AUTO: 1.06 K/UL
MONOCYTES NFR BLD AUTO: 17.1 %
NEUTROPHILS # BLD AUTO: 3.36 K/UL
NEUTROPHILS NFR BLD AUTO: 54.2 %
NONHDLC SERPL-MCNC: 82 MG/DL
PLATELET # BLD AUTO: 276 K/UL
POTASSIUM SERPL-SCNC: 5.8 MMOL/L
PROT SERPL-MCNC: 8.2 G/DL
RBC # BLD: 4.07 M/UL
RBC # FLD: 14.9 %
SODIUM SERPL-SCNC: 137 MMOL/L
TIBC SERPL-MCNC: 436 UG/DL
TRIGL SERPL-MCNC: 131 MG/DL
UIBC SERPL-MCNC: 358 UG/DL
VIT B12 SERPL-MCNC: 208 PG/ML
WBC # FLD AUTO: 6.2 K/UL

## 2025-03-27 ENCOUNTER — APPOINTMENT (OUTPATIENT)
Dept: INTERNAL MEDICINE | Facility: CLINIC | Age: 80
End: 2025-03-27
Payer: MEDICARE

## 2025-03-27 PROCEDURE — 96372 THER/PROPH/DIAG INJ SC/IM: CPT

## 2025-03-27 RX ORDER — CYANOCOBALAMIN 1000 UG/ML
1000 INJECTION, SOLUTION INTRAMUSCULAR; SUBCUTANEOUS
Qty: 0 | Refills: 0 | Status: COMPLETED | OUTPATIENT
Start: 2025-03-27

## 2025-03-27 RX ADMIN — CYANOCOBALAMIN 1 MCG/ML: 1000 INJECTION, SOLUTION INTRAMUSCULAR at 00:00

## 2025-04-25 ENCOUNTER — APPOINTMENT (OUTPATIENT)
Dept: INTERNAL MEDICINE | Facility: CLINIC | Age: 80
End: 2025-04-25
Payer: MEDICARE

## 2025-04-25 PROCEDURE — 96372 THER/PROPH/DIAG INJ SC/IM: CPT

## 2025-04-25 RX ORDER — CYANOCOBALAMIN 1000 UG/ML
1000 INJECTION, SOLUTION INTRAMUSCULAR; SUBCUTANEOUS
Qty: 0 | Refills: 0 | Status: COMPLETED | OUTPATIENT
Start: 2025-04-21

## 2025-05-23 ENCOUNTER — APPOINTMENT (OUTPATIENT)
Dept: INTERNAL MEDICINE | Facility: CLINIC | Age: 80
End: 2025-05-23
Payer: MEDICARE

## 2025-05-23 DIAGNOSIS — D64.9 ANEMIA, UNSPECIFIED: ICD-10-CM

## 2025-05-23 PROCEDURE — 36415 COLL VENOUS BLD VENIPUNCTURE: CPT

## 2025-05-23 PROCEDURE — 96372 THER/PROPH/DIAG INJ SC/IM: CPT

## 2025-06-27 ENCOUNTER — RX RENEWAL (OUTPATIENT)
Age: 80
End: 2025-06-27

## 2025-09-16 ENCOUNTER — RX RENEWAL (OUTPATIENT)
Age: 80
End: 2025-09-16